# Patient Record
Sex: FEMALE | Race: OTHER | HISPANIC OR LATINO | URBAN - METROPOLITAN AREA
[De-identification: names, ages, dates, MRNs, and addresses within clinical notes are randomized per-mention and may not be internally consistent; named-entity substitution may affect disease eponyms.]

---

## 2021-07-08 ENCOUNTER — EMERGENCY (EMERGENCY)
Facility: HOSPITAL | Age: 21
LOS: 1 days | Discharge: LEFT BEFORE DISCHARGE | End: 2021-07-08
Payer: MEDICAID

## 2021-07-08 ENCOUNTER — INPATIENT (INPATIENT)
Facility: HOSPITAL | Age: 21
LOS: 0 days | Discharge: HOME | End: 2021-07-09
Attending: OBSTETRICS & GYNECOLOGY | Admitting: OBSTETRICS & GYNECOLOGY
Payer: COMMERCIAL

## 2021-07-08 VITALS — DIASTOLIC BLOOD PRESSURE: 68 MMHG | HEART RATE: 98 BPM | SYSTOLIC BLOOD PRESSURE: 170 MMHG

## 2021-07-08 LAB
ABO RH CONFIRMATION: SIGNIFICANT CHANGE UP
BASOPHILS # BLD AUTO: 0.04 K/UL — SIGNIFICANT CHANGE UP (ref 0–0.2)
BASOPHILS NFR BLD AUTO: 0.4 % — SIGNIFICANT CHANGE UP (ref 0–2)
BLD GP AB SCN SERPL QL: SIGNIFICANT CHANGE UP
EOSINOPHIL # BLD AUTO: 0.08 K/UL — SIGNIFICANT CHANGE UP (ref 0–0.5)
EOSINOPHIL NFR BLD AUTO: 0.8 % — SIGNIFICANT CHANGE UP (ref 0–6)
HCT VFR BLD CALC: 33.2 % — LOW (ref 34.5–45)
HGB BLD-MCNC: 10.8 G/DL — LOW (ref 11.5–15.5)
HIV 1 & 2 AB SERPL IA.RAPID: SIGNIFICANT CHANGE UP
IMM GRANULOCYTES NFR BLD AUTO: 0.3 % — SIGNIFICANT CHANGE UP (ref 0–1.5)
LYMPHOCYTES # BLD AUTO: 2.37 K/UL — SIGNIFICANT CHANGE UP (ref 1–3.3)
LYMPHOCYTES # BLD AUTO: 24.5 % — SIGNIFICANT CHANGE UP (ref 13–44)
MCHC RBC-ENTMCNC: 24.6 PG — LOW (ref 27–34)
MCHC RBC-ENTMCNC: 32.5 GM/DL — SIGNIFICANT CHANGE UP (ref 32–36)
MCV RBC AUTO: 75.6 FL — LOW (ref 80–100)
MONOCYTES # BLD AUTO: 0.8 K/UL — SIGNIFICANT CHANGE UP (ref 0–0.9)
MONOCYTES NFR BLD AUTO: 8.3 % — SIGNIFICANT CHANGE UP (ref 2–14)
NEUTROPHILS # BLD AUTO: 6.37 K/UL — SIGNIFICANT CHANGE UP (ref 1.8–7.4)
NEUTROPHILS NFR BLD AUTO: 65.7 % — SIGNIFICANT CHANGE UP (ref 43–77)
PLATELET # BLD AUTO: 212 K/UL — SIGNIFICANT CHANGE UP (ref 150–400)
RBC # BLD: 4.39 M/UL — SIGNIFICANT CHANGE UP (ref 3.8–5.2)
RBC # FLD: 13.5 % — SIGNIFICANT CHANGE UP (ref 10.3–14.5)
SARS-COV-2 RNA SPEC QL NAA+PROBE: SIGNIFICANT CHANGE UP
WBC # BLD: 9.69 K/UL — SIGNIFICANT CHANGE UP (ref 3.8–10.5)
WBC # FLD AUTO: 9.69 K/UL — SIGNIFICANT CHANGE UP (ref 3.8–10.5)

## 2021-07-08 PROCEDURE — L9993: CPT

## 2021-07-08 RX ORDER — CITRIC ACID/SODIUM CITRATE 300-500 MG
30 SOLUTION, ORAL ORAL ONCE
Refills: 0 | Status: DISCONTINUED | OUTPATIENT
Start: 2021-07-08 | End: 2021-07-08

## 2021-07-08 RX ORDER — AER TRAVELER 0.5 G/1
1 SOLUTION RECTAL; TOPICAL EVERY 4 HOURS
Refills: 0 | Status: DISCONTINUED | OUTPATIENT
Start: 2021-07-08 | End: 2021-07-09

## 2021-07-08 RX ORDER — OXYCODONE HYDROCHLORIDE 5 MG/1
5 TABLET ORAL
Refills: 0 | Status: DISCONTINUED | OUTPATIENT
Start: 2021-07-08 | End: 2021-07-09

## 2021-07-08 RX ORDER — LANOLIN
1 OINTMENT (GRAM) TOPICAL EVERY 6 HOURS
Refills: 0 | Status: DISCONTINUED | OUTPATIENT
Start: 2021-07-08 | End: 2021-07-09

## 2021-07-08 RX ORDER — DIBUCAINE 1 %
1 OINTMENT (GRAM) RECTAL EVERY 6 HOURS
Refills: 0 | Status: DISCONTINUED | OUTPATIENT
Start: 2021-07-08 | End: 2021-07-09

## 2021-07-08 RX ORDER — MAGNESIUM HYDROXIDE 400 MG/1
30 TABLET, CHEWABLE ORAL
Refills: 0 | Status: DISCONTINUED | OUTPATIENT
Start: 2021-07-08 | End: 2021-07-09

## 2021-07-08 RX ORDER — KETOROLAC TROMETHAMINE 30 MG/ML
30 SYRINGE (ML) INJECTION ONCE
Refills: 0 | Status: DISCONTINUED | OUTPATIENT
Start: 2021-07-08 | End: 2021-07-09

## 2021-07-08 RX ORDER — IBUPROFEN 200 MG
600 TABLET ORAL EVERY 6 HOURS
Refills: 0 | Status: COMPLETED | OUTPATIENT
Start: 2021-07-08 | End: 2022-06-06

## 2021-07-08 RX ORDER — PRAMOXINE HYDROCHLORIDE 150 MG/15G
1 AEROSOL, FOAM RECTAL EVERY 4 HOURS
Refills: 0 | Status: DISCONTINUED | OUTPATIENT
Start: 2021-07-08 | End: 2021-07-09

## 2021-07-08 RX ORDER — BENZOCAINE 10 %
1 GEL (GRAM) MUCOUS MEMBRANE EVERY 6 HOURS
Refills: 0 | Status: DISCONTINUED | OUTPATIENT
Start: 2021-07-08 | End: 2021-07-09

## 2021-07-08 RX ORDER — TETANUS TOXOID, REDUCED DIPHTHERIA TOXOID AND ACELLULAR PERTUSSIS VACCINE, ADSORBED 5; 2.5; 8; 8; 2.5 [IU]/.5ML; [IU]/.5ML; UG/.5ML; UG/.5ML; UG/.5ML
0.5 SUSPENSION INTRAMUSCULAR ONCE
Refills: 0 | Status: DISCONTINUED | OUTPATIENT
Start: 2021-07-08 | End: 2021-07-09

## 2021-07-08 RX ORDER — SODIUM CHLORIDE 9 MG/ML
1000 INJECTION, SOLUTION INTRAVENOUS
Refills: 0 | Status: DISCONTINUED | OUTPATIENT
Start: 2021-07-08 | End: 2021-07-08

## 2021-07-08 RX ORDER — HYDROCORTISONE 1 %
1 OINTMENT (GRAM) TOPICAL EVERY 6 HOURS
Refills: 0 | Status: DISCONTINUED | OUTPATIENT
Start: 2021-07-08 | End: 2021-07-09

## 2021-07-08 RX ORDER — ACETAMINOPHEN 500 MG
975 TABLET ORAL
Refills: 0 | Status: DISCONTINUED | OUTPATIENT
Start: 2021-07-08 | End: 2021-07-09

## 2021-07-08 RX ORDER — OXYTOCIN 10 UNIT/ML
333.33 VIAL (ML) INJECTION
Qty: 20 | Refills: 0 | Status: DISCONTINUED | OUTPATIENT
Start: 2021-07-08 | End: 2021-07-08

## 2021-07-08 RX ORDER — SIMETHICONE 80 MG/1
80 TABLET, CHEWABLE ORAL EVERY 4 HOURS
Refills: 0 | Status: DISCONTINUED | OUTPATIENT
Start: 2021-07-08 | End: 2021-07-09

## 2021-07-08 RX ORDER — OXYCODONE HYDROCHLORIDE 5 MG/1
5 TABLET ORAL ONCE
Refills: 0 | Status: DISCONTINUED | OUTPATIENT
Start: 2021-07-08 | End: 2021-07-09

## 2021-07-08 RX ORDER — DIPHENHYDRAMINE HCL 50 MG
25 CAPSULE ORAL EVERY 6 HOURS
Refills: 0 | Status: DISCONTINUED | OUTPATIENT
Start: 2021-07-08 | End: 2021-07-09

## 2021-07-08 RX ORDER — SODIUM CHLORIDE 9 MG/ML
3 INJECTION INTRAMUSCULAR; INTRAVENOUS; SUBCUTANEOUS EVERY 8 HOURS
Refills: 0 | Status: DISCONTINUED | OUTPATIENT
Start: 2021-07-08 | End: 2021-07-09

## 2021-07-08 RX ORDER — OXYTOCIN 10 UNIT/ML
333.33 VIAL (ML) INJECTION
Qty: 20 | Refills: 0 | Status: DISCONTINUED | OUTPATIENT
Start: 2021-07-08 | End: 2021-07-09

## 2021-07-08 RX ADMIN — SODIUM CHLORIDE 3 MILLILITER(S): 9 INJECTION INTRAMUSCULAR; INTRAVENOUS; SUBCUTANEOUS at 21:51

## 2021-07-08 RX ADMIN — Medication 1000 MILLIUNIT(S)/MIN: at 19:01

## 2021-07-08 NOTE — OB RN DELIVERY SUMMARY - NSSELHIDDEN_OBGYN_ALL_OB_FT
[NS_DeliveryAttending1_OBGYN_ALL_OB_FT:[id number]=],[NS_DeliveryRN_OBGYN_ALL_OB_FT:[id number]=]

## 2021-07-08 NOTE — OB PROVIDER H&P - ASSESSMENT
Patient is a 21y  at 35w by LMP who presented to L&D in labor.     Plan    - Admission labs: CBC, type and screen, ABO confirmation, urinalysis, RPR, covid-19 pcr stat  - IV fluids: LR  - Pain management: none at this time  - Continuous FHT and Tocometry  - GBS prophylaxis: GBS unknown, however, not enough time to start ampicillin  - expectant management    d/w Dr. Batista

## 2021-07-08 NOTE — OB NEONATOLOGY/PEDIATRICIAN DELIVERY SUMMARY - NSPEDSNEONOTESA_OBGYN_ALL_OB_FT
Requested by Dr. Araiza to attend this  vaginal delivery due to fully dilated, no prenatal care and  meconium of a y/o  mom at @36wks by dates 35weeks by bedside sonogram.  She had no PNC, ALL LABS and COVID swap sended stat.  Unknown maternal history .  L&D: ROM at time of delivery clear fluids.  Baby born vertex  good cry and tone transferred to warmer, orally suctioned, dried,  stimulated; Resuscitation: routine.  BW: 2970 g.  Apgar score 9/9.   A: 35-36  week AGA   maternal COVID-19 pending. Mother wants to give infant for adoption; don't want to see the baby.  P: Baby for transition and when stable to NBN.

## 2021-07-08 NOTE — OB PROVIDER H&P - HISTORY OF PRESENT ILLNESS
Patient is a 21y  at 35w approximately (by LMP). She stated that she went to have an  today and they sent her to the hospital because they said that she was in labor. She denies any complications in this pregnancy however she did not know she was pregnant until today.   Pregnancy complicated by:  1. no prenatal care    obhx:  G1: 2017- normal spontaneous vaginal delivery  gynhx: no history of abnormal pap smears, ovarians cysts, or history of STIs  pmhx: none  pshx: none  meds: pnv  allergies: nkda

## 2021-07-08 NOTE — OB PROVIDER DELIVERY SUMMARY - NSPROVIDERDELIVERYNOTE_OBGYN_ALL_OB_FT
Vaginal Delivery Summary    Procedure: Normal spontaneous vaginal delivery   Findings: Viable female infant delivered in cephalic presentation at 1901, placenta delivered at 1905.  Apgar scores 9/9  Weight 2970g  Laceration(s): 1st degree perineal  Repair: 2-0 vicryl  EBL: 200 ml  Complications: no prenatal care,  delivery, precipitous delivery    Procedure:   Patient felt rectal pressure and was found to be fully dilated, +2 station. She pushed effectively for 5 minutes. She delivered a viable female infant. Placenta delivered intact and pitocin was started at the delivery of infant. Perineum and vagina were inspected, first laceration(s) present. Adequate hemostasis was obtained.

## 2021-07-08 NOTE — OB PROVIDER H&P - NSHPPHYSICALEXAM_GEN_ALL_CORE
General: Alert and oriented x3, no acute distress  Cardiovascular: regular rate and rhythm, no murmurs, rubs or gallops appreciated on exam  Respiratory: clear to auscultation bilaterally  Abdominal: gravid uterus, non-tender to palpation  Pelvic: 10/100/+1  Extremities: no redness, tenderness or swelling in lower extremities bilaterally     FHT: baseline 135bpm, moderate variability, +accels, -deccels  [place]: regular contractions  Ultrasound: vertex, FL shows approximately 36w gestation

## 2021-07-08 NOTE — OB RN DELIVERY SUMMARY - NS_SEPSISRSKCALC_OBGYN_ALL_OB_FT
No temperature has been documented for this patient in CPN or on the OB Flowsheet. Ensure the highest temperature during labor was documented on the OB Flowsheet.  Rupture of membranes must be entered above.  GBS status in the 'Prenatal Lab tests/results section' on the OB RN Patient Profile must be documented.   EOS calculated successfully. EOS Risk Factor: 0.5/1000 live births ([facility] national incidence); GA=35w;Temp=98.8; ROM=0.167; GBS='Unknown'; Antibiotics='No antibiotics or any antibiotics < 2 hrs prior to birth'

## 2021-07-08 NOTE — OB PROVIDER DELIVERY SUMMARY - NSSELHIDDEN_OBGYN_ALL_OB_FT
[NS_DeliveryAttending1_OBGYN_ALL_OB_FT:[email or web address]=],[NS_DeliveryRN_OBGYN_ALL_OB_FT:[id number]=]

## 2021-07-09 ENCOUNTER — TRANSCRIPTION ENCOUNTER (OUTPATIENT)
Age: 21
End: 2021-07-09

## 2021-07-09 VITALS
TEMPERATURE: 99 F | HEART RATE: 70 BPM | OXYGEN SATURATION: 100 % | DIASTOLIC BLOOD PRESSURE: 67 MMHG | SYSTOLIC BLOOD PRESSURE: 103 MMHG | RESPIRATION RATE: 18 BRPM

## 2021-07-09 LAB
COVID-19 SPIKE DOMAIN AB INTERP: POSITIVE
COVID-19 SPIKE DOMAIN ANTIBODY RESULT: >250 U/ML — HIGH
HBV SURFACE AG SERPL QL IA: SIGNIFICANT CHANGE UP
HCT VFR BLD CALC: 31.8 % — LOW (ref 34.5–45)
HGB BLD-MCNC: 10.1 G/DL — LOW (ref 11.5–15.5)
HIV 1+2 AB+HIV1 P24 AG SERPL QL IA: SIGNIFICANT CHANGE UP
MEV IGG SER-ACNC: 29.2 AU/ML — SIGNIFICANT CHANGE UP
MEV IGG+IGM SER-IMP: POSITIVE — SIGNIFICANT CHANGE UP
RUBV IGG SER-ACNC: 0.9 INDEX — SIGNIFICANT CHANGE UP
RUBV IGG SER-IMP: SIGNIFICANT CHANGE UP
SARS-COV-2 IGG+IGM SERPL QL IA: >250 U/ML — HIGH
SARS-COV-2 IGG+IGM SERPL QL IA: POSITIVE
T PALLIDUM AB TITR SER: NEGATIVE — SIGNIFICANT CHANGE UP

## 2021-07-09 PROCEDURE — 86901 BLOOD TYPING SEROLOGIC RH(D): CPT

## 2021-07-09 PROCEDURE — 86780 TREPONEMA PALLIDUM: CPT

## 2021-07-09 PROCEDURE — 85025 COMPLETE CBC W/AUTO DIFF WBC: CPT

## 2021-07-09 PROCEDURE — 87340 HEPATITIS B SURFACE AG IA: CPT

## 2021-07-09 PROCEDURE — 88307 TISSUE EXAM BY PATHOLOGIST: CPT | Mod: 26

## 2021-07-09 PROCEDURE — 88307 TISSUE EXAM BY PATHOLOGIST: CPT

## 2021-07-09 PROCEDURE — 87635 SARS-COV-2 COVID-19 AMP PRB: CPT

## 2021-07-09 PROCEDURE — 86765 RUBEOLA ANTIBODY: CPT

## 2021-07-09 PROCEDURE — 86850 RBC ANTIBODY SCREEN: CPT

## 2021-07-09 PROCEDURE — 86762 RUBELLA ANTIBODY: CPT

## 2021-07-09 PROCEDURE — G0463: CPT

## 2021-07-09 PROCEDURE — 86703 HIV-1/HIV-2 1 RESULT ANTBDY: CPT

## 2021-07-09 PROCEDURE — 86769 SARS-COV-2 COVID-19 ANTIBODY: CPT

## 2021-07-09 PROCEDURE — 59050 FETAL MONITOR W/REPORT: CPT

## 2021-07-09 PROCEDURE — 86900 BLOOD TYPING SEROLOGIC ABO: CPT

## 2021-07-09 PROCEDURE — 85014 HEMATOCRIT: CPT

## 2021-07-09 PROCEDURE — 59025 FETAL NON-STRESS TEST: CPT

## 2021-07-09 PROCEDURE — 36415 COLL VENOUS BLD VENIPUNCTURE: CPT

## 2021-07-09 PROCEDURE — 85018 HEMOGLOBIN: CPT

## 2021-07-09 PROCEDURE — 87389 HIV-1 AG W/HIV-1&-2 AB AG IA: CPT

## 2021-07-09 RX ORDER — ACETAMINOPHEN 500 MG
3 TABLET ORAL
Qty: 0 | Refills: 0 | DISCHARGE
Start: 2021-07-09

## 2021-07-09 RX ORDER — IBUPROFEN 200 MG
1 TABLET ORAL
Qty: 0 | Refills: 0 | DISCHARGE
Start: 2021-07-09

## 2021-07-09 RX ORDER — IBUPROFEN 200 MG
600 TABLET ORAL EVERY 6 HOURS
Refills: 0 | Status: DISCONTINUED | OUTPATIENT
Start: 2021-07-09 | End: 2021-07-09

## 2021-07-09 RX ADMIN — SODIUM CHLORIDE 3 MILLILITER(S): 9 INJECTION INTRAMUSCULAR; INTRAVENOUS; SUBCUTANEOUS at 06:15

## 2021-07-09 RX ADMIN — Medication 975 MILLIGRAM(S): at 09:30

## 2021-07-09 RX ADMIN — Medication 600 MILLIGRAM(S): at 06:14

## 2021-07-09 RX ADMIN — Medication 600 MILLIGRAM(S): at 12:12

## 2021-07-09 RX ADMIN — Medication 600 MILLIGRAM(S): at 12:45

## 2021-07-09 RX ADMIN — Medication 1 TABLET(S): at 12:12

## 2021-07-09 RX ADMIN — Medication 975 MILLIGRAM(S): at 08:48

## 2021-07-09 RX ADMIN — Medication 600 MILLIGRAM(S): at 06:49

## 2021-07-09 NOTE — DISCHARGE NOTE OB - HOSPITAL COURSE
Pt is 22yo  s/p  at approx 35 weeks, precipitous. No PNC. She was transferred to postpartum unit without complications during her stay. Upon discharge she is voiding, tolerating PO, ambulating, and pain is well controlled.

## 2021-07-09 NOTE — DISCHARGE NOTE OB - CARE PLAN
Principal Discharge DX:	 (normal spontaneous vaginal delivery)  Goal:	rapid recovery  Assessment and plan of treatment:	Please call your provider to schedule postpartum visit in 6 weeks. Take medications as directed, regular diet, activity as tolerated. Exclusive breast feeding for the first 6 months is recommended. Nothing per vagina for 6 weeks (incl. sex, douching, etc). If you have additional concerns, please inform your provider.

## 2021-07-09 NOTE — DISCHARGE NOTE OB - CARE PROVIDER_API CALL
Jen Payan)  Obstetrics and Gynecology  [address] [place] [address]  Phone: [phone]  Fax: [phone]  Follow Up Time:

## 2021-07-09 NOTE — PROGRESS NOTE ADULT - ASSESSMENT
A/P: ALYCIA HERNANDEZ is a 21y  now PPD#1 s/p spontaneous vaginal delivery at approximately 35 weeks gestation, uncomplicated.  -Vital signs stable  -Hgb: 11.4 -> AM labs pending   -Voiding, tolerating PO, bowel function nml   -FU with social work for adoption of baby  -FU PNL  -Advance care as tolerated   -Continue routine postpartum care and education  -Dispo: Patient to be discharged when meeting all postpartum milestones and pending attending approval.

## 2021-07-09 NOTE — DISCHARGE NOTE OB - PATIENT PORTAL LINK FT
You can access the FollowMyHealth Patient Portal offered by [facility] by registering at the following website: [email or web address]. By joining [employer]’s FollowMyHealth portal, you will also be able to view your health information using other applications (apps) compatible with our system.

## 2021-07-09 NOTE — PROGRESS NOTE ADULT - SUBJECTIVE AND OBJECTIVE BOX
ALYCIA HERNANDEZ is a 21y  now PPD#1 s/p spontaneous vaginal delivery at approximately 35 weeks gestation, uncomplicated.    S:    No acute events overnight.   The patient has no complaints.  Pain controlled with current treatment regimen.   She is ambulating without difficulty and tolerating PO.   + flatus/-BM/+ voiding   She endorses appropriate lochia, which is decreasing.   She denies fevers, chills, nausea and vomiting.   She denies lightheadedness, dizziness, palpitations, chest pain and SOB.     O:    Vital Signs Last 24 Hrs  T(C): 37 (2021 15:30), Max: 37.1 (2021 04:10)  T(F): 98.6 (2021 15:30), Max: 98.7 (2021 04:10)  HR: 70 (2021 15:30) (70 - 71)  BP: 103/67 (2021 15:30) (103/67 - 109/66)  RR: 18 (2021 15:30) (18 - 18)  SpO2: 100% (2021 15:30) (98% - 100%)    Gen: NAD, AOx3  CV: RRR, S1/S2 present  Pulm: CTAB  Abdomen:  Soft, non-tender, non-distended, +bowel sounds  Uterus:  Fundus firm below umbilicus  VE:  Expectant lochia  Ext:  Non-tender and non-edematous                          10.1   x     )-----------( x        ( 2021 07:30 )             31.8

## 2021-07-21 LAB — SURGICAL PATHOLOGY STUDY: SIGNIFICANT CHANGE UP

## 2021-08-24 NOTE — OB RN DELIVERY SUMMARY - BABY A: APGAR 5 MIN COLOR, DELIVERY
Epidural Block    Date/Time: 8/23/2021 7:05 PM  Performed by: Heidi Cardona CRNA  Authorized by: Heidi Cardona CRNA     Patient Location:  L&D  Indication: Labor Pain    Pre anesthesia checklist Patient Identified, Risks and Benefits Discussed, Monitors and Equipment Checked, Timeout Performed, Pre-op Evaluation Complete and IV Bolus - Crystalloid  Epidural:     Patient Position:  Sitting    Prep:  Chlorhoidine Gluconate    Max Sterile Barrier Technique:  Hand Washing, Cap/Mask, Sterile gloves, Sterile drape and Sterile dressing applied    Monitoring:  Continuous pulse oximetry, heart rate, non-invasive blood pressure and FHT's    Approach:  Midline    Location:  L3-4  Injection Technique:  ERICH air  Ultrasound Used:  No  Needle and Epidural Catheter:     Needle Type:  Tuohy    Needle Gauge:  18 G    Needle Length:  3.5 in    ERICH Depth:  5 cm  Epidural catheter type: multiorifice.  Catheter Size:  20 G  Catheter at Skin Depth:  10 cm  Securement:  Stabilization device, Tape and Transparent dressing  Test Dose:  Lidocaine 1.5% with epinephrine 1-to-200,000  Test Dose Volume (mL):  5  Test Dose Result:  Negative  Staff:     Performed By:  MATT    CRNA:  Heidi Cardona CRNA  Start Time:  8/23/2021 7:03 PM  Notes:      Patient ID'd and consented. Patient in sitting position. Monitors applied and vitals recorded. Sterile prep and drape. 18g Toughy inserted using ERICH technique. ERICH at 5. Catheter threaded to 10. Catheter secured with sterile occlusive dressing. Negative for paresthesia, negative aspiration for heme, and negative aspiration for CSF. Negative test dose with 75mg lidocaine with 25 mcg epinephrine. Patient tolerated well.            (1) body pink, extremities blue

## 2021-08-27 NOTE — OB RN PATIENT PROFILE - NSSTATEDREASONFORADM_OBGYN_A_OB_FT
Spoke to dad and advised to check mychart for result message. Read message to dad he did not have any other concerns or questions.  labor

## 2022-05-19 NOTE — OB RN PATIENT PROFILE - PAIN SCALE PREFERRED, PROFILE
patient c/o Dyspneic on exertion, for the past couple of day, hx of thalassemia, anemia and sickle cell. is scheduled for transfusion at [facility] but can not take "it" any more. was also told that her hemoglobin was low at 5.9. numerical 0-10

## 2023-01-19 ENCOUNTER — INPATIENT (INPATIENT)
Facility: HOSPITAL | Age: 23
LOS: 1 days | Discharge: HOME | DRG: 419 | End: 2023-01-21
Attending: SURGERY | Admitting: SURGERY
Payer: COMMERCIAL

## 2023-01-19 VITALS
WEIGHT: 154.98 LBS | DIASTOLIC BLOOD PRESSURE: 71 MMHG | OXYGEN SATURATION: 100 % | RESPIRATION RATE: 16 BRPM | HEART RATE: 77 BPM | SYSTOLIC BLOOD PRESSURE: 119 MMHG | TEMPERATURE: 100 F

## 2023-01-19 LAB
ALBUMIN SERPL ELPH-MCNC: 4.3 G/DL — SIGNIFICANT CHANGE UP (ref 3.3–5.2)
ALP SERPL-CCNC: 113 U/L — SIGNIFICANT CHANGE UP (ref 40–120)
ALT FLD-CCNC: 14 U/L — SIGNIFICANT CHANGE UP
APPEARANCE UR: CLEAR — SIGNIFICANT CHANGE UP
AST SERPL-CCNC: 18 U/L — SIGNIFICANT CHANGE UP
BACTERIA # UR AUTO: ABNORMAL
BASOPHILS # BLD AUTO: 0.05 K/UL — SIGNIFICANT CHANGE UP (ref 0–0.2)
BASOPHILS NFR BLD AUTO: 0.7 % — SIGNIFICANT CHANGE UP (ref 0–2)
BILIRUB SERPL-MCNC: 0.2 MG/DL — LOW (ref 0.4–2)
BILIRUB UR-MCNC: NEGATIVE — SIGNIFICANT CHANGE UP
BUN SERPL-MCNC: 7 MG/DL — LOW (ref 8–20)
CALCIUM SERPL-MCNC: 9.1 MG/DL — SIGNIFICANT CHANGE UP (ref 8.4–10.5)
CHLORIDE SERPL-SCNC: 105 MMOL/L — SIGNIFICANT CHANGE UP (ref 96–108)
CO2 SERPL-SCNC: 23 MMOL/L — SIGNIFICANT CHANGE UP (ref 22–29)
COLOR SPEC: YELLOW — SIGNIFICANT CHANGE UP
CREAT SERPL-MCNC: 0.6 MG/DL — SIGNIFICANT CHANGE UP (ref 0.5–1.3)
DIFF PNL FLD: ABNORMAL
EGFR: 129 ML/MIN/1.73M2 — SIGNIFICANT CHANGE UP
EOSINOPHIL # BLD AUTO: 0.23 K/UL — SIGNIFICANT CHANGE UP (ref 0–0.5)
EOSINOPHIL NFR BLD AUTO: 3 % — SIGNIFICANT CHANGE UP (ref 0–6)
EPI CELLS # UR: SIGNIFICANT CHANGE UP
GLUCOSE SERPL-MCNC: 82 MG/DL — SIGNIFICANT CHANGE UP (ref 70–99)
GLUCOSE UR QL: NEGATIVE MG/DL — SIGNIFICANT CHANGE UP
HCG SERPL-ACNC: <4 MIU/ML — SIGNIFICANT CHANGE UP
HCT VFR BLD CALC: 38.1 % — SIGNIFICANT CHANGE UP (ref 34.5–45)
HGB BLD-MCNC: 12.6 G/DL — SIGNIFICANT CHANGE UP (ref 11.5–15.5)
IMM GRANULOCYTES NFR BLD AUTO: 0.1 % — SIGNIFICANT CHANGE UP (ref 0–0.9)
KETONES UR-MCNC: NEGATIVE — SIGNIFICANT CHANGE UP
LEUKOCYTE ESTERASE UR-ACNC: NEGATIVE — SIGNIFICANT CHANGE UP
LIDOCAIN IGE QN: 36 U/L — SIGNIFICANT CHANGE UP (ref 22–51)
LYMPHOCYTES # BLD AUTO: 2.04 K/UL — SIGNIFICANT CHANGE UP (ref 1–3.3)
LYMPHOCYTES # BLD AUTO: 27 % — SIGNIFICANT CHANGE UP (ref 13–44)
MCHC RBC-ENTMCNC: 27.5 PG — SIGNIFICANT CHANGE UP (ref 27–34)
MCHC RBC-ENTMCNC: 33.1 GM/DL — SIGNIFICANT CHANGE UP (ref 32–36)
MCV RBC AUTO: 83 FL — SIGNIFICANT CHANGE UP (ref 80–100)
MONOCYTES # BLD AUTO: 0.71 K/UL — SIGNIFICANT CHANGE UP (ref 0–0.9)
MONOCYTES NFR BLD AUTO: 9.4 % — SIGNIFICANT CHANGE UP (ref 2–14)
NEUTROPHILS # BLD AUTO: 4.52 K/UL — SIGNIFICANT CHANGE UP (ref 1.8–7.4)
NEUTROPHILS NFR BLD AUTO: 59.8 % — SIGNIFICANT CHANGE UP (ref 43–77)
NITRITE UR-MCNC: NEGATIVE — SIGNIFICANT CHANGE UP
PH UR: 6 — SIGNIFICANT CHANGE UP (ref 5–8)
PLATELET # BLD AUTO: 233 K/UL — SIGNIFICANT CHANGE UP (ref 150–400)
POTASSIUM SERPL-MCNC: 4.1 MMOL/L — SIGNIFICANT CHANGE UP (ref 3.5–5.3)
POTASSIUM SERPL-SCNC: 4.1 MMOL/L — SIGNIFICANT CHANGE UP (ref 3.5–5.3)
PROT SERPL-MCNC: 7.5 G/DL — SIGNIFICANT CHANGE UP (ref 6.6–8.7)
PROT UR-MCNC: NEGATIVE — SIGNIFICANT CHANGE UP
RBC # BLD: 4.59 M/UL — SIGNIFICANT CHANGE UP (ref 3.8–5.2)
RBC # FLD: 11.9 % — SIGNIFICANT CHANGE UP (ref 10.3–14.5)
RBC CASTS # UR COMP ASSIST: ABNORMAL /HPF (ref 0–4)
SODIUM SERPL-SCNC: 140 MMOL/L — SIGNIFICANT CHANGE UP (ref 135–145)
SP GR SPEC: 1.01 — SIGNIFICANT CHANGE UP (ref 1.01–1.02)
UROBILINOGEN FLD QL: NEGATIVE MG/DL — SIGNIFICANT CHANGE UP
WBC # BLD: 7.56 K/UL — SIGNIFICANT CHANGE UP (ref 3.8–10.5)
WBC # FLD AUTO: 7.56 K/UL — SIGNIFICANT CHANGE UP (ref 3.8–10.5)
WBC UR QL: SIGNIFICANT CHANGE UP /HPF (ref 0–5)

## 2023-01-19 PROCEDURE — 99285 EMERGENCY DEPT VISIT HI MDM: CPT

## 2023-01-19 RX ORDER — ONDANSETRON 8 MG/1
4 TABLET, FILM COATED ORAL ONCE
Refills: 0 | Status: COMPLETED | OUTPATIENT
Start: 2023-01-19 | End: 2023-01-19

## 2023-01-19 RX ORDER — MORPHINE SULFATE 50 MG/1
4 CAPSULE, EXTENDED RELEASE ORAL ONCE
Refills: 0 | Status: DISCONTINUED | OUTPATIENT
Start: 2023-01-19 | End: 2023-01-19

## 2023-01-19 RX ORDER — SODIUM CHLORIDE 9 MG/ML
1000 INJECTION INTRAMUSCULAR; INTRAVENOUS; SUBCUTANEOUS ONCE
Refills: 0 | Status: COMPLETED | OUTPATIENT
Start: 2023-01-19 | End: 2023-01-19

## 2023-01-19 RX ADMIN — MORPHINE SULFATE 4 MILLIGRAM(S): 50 CAPSULE, EXTENDED RELEASE ORAL at 20:44

## 2023-01-19 RX ADMIN — ONDANSETRON 4 MILLIGRAM(S): 8 TABLET, FILM COATED ORAL at 20:44

## 2023-01-19 RX ADMIN — SODIUM CHLORIDE 1000 MILLILITER(S): 9 INJECTION INTRAMUSCULAR; INTRAVENOUS; SUBCUTANEOUS at 20:45

## 2023-01-19 NOTE — ED STATDOCS - OBJECTIVE STATEMENT
23 y/o female presents to the ED c/o 3 days of right sided abdominal pain without associated fevers, chills, N/V/D. Pt denies hx of gall stones, past abdominal surgeries. LMP 1/11.     : Doris

## 2023-01-19 NOTE — ED ADULT TRIAGE NOTE - CHIEF COMPLAINT QUOTE
Patient c/o RLQ pain since Monday. Denies N/V/D or medical hx. Patient has been taking ibuprofen and Tylenol without relief.

## 2023-01-19 NOTE — ED STATDOCS - PROGRESS NOTE DETAILS
PT evaluated by intake physician. HPI/PE/ROS as noted above. Will follow up plan per intake physician. Labs explained to pt Surgery consulted based on results. Pt will be admitted to surgery for further management and evaluation

## 2023-01-19 NOTE — ED STATDOCS - CLINICAL SUMMARY MEDICAL DECISION MAKING FREE TEXT BOX
Pt with 3 days of RUQ abdominal pain will check labs, give IV fluids for hydration, morphine for pain, will obtain US to evaluate for biliary pathology.

## 2023-01-20 ENCOUNTER — TRANSCRIPTION ENCOUNTER (OUTPATIENT)
Age: 23
End: 2023-01-20

## 2023-01-20 ENCOUNTER — RESULT REVIEW (OUTPATIENT)
Age: 23
End: 2023-01-20

## 2023-01-20 DIAGNOSIS — K81.0 ACUTE CHOLECYSTITIS: ICD-10-CM

## 2023-01-20 LAB
ALBUMIN SERPL ELPH-MCNC: 3.8 G/DL — SIGNIFICANT CHANGE UP (ref 3.3–5.2)
ALP SERPL-CCNC: 103 U/L — SIGNIFICANT CHANGE UP (ref 40–120)
ALT FLD-CCNC: 15 U/L — SIGNIFICANT CHANGE UP
ANION GAP SERPL CALC-SCNC: 10 MMOL/L — SIGNIFICANT CHANGE UP (ref 5–17)
APTT BLD: 29.7 SEC — SIGNIFICANT CHANGE UP (ref 27.5–35.5)
AST SERPL-CCNC: 19 U/L — SIGNIFICANT CHANGE UP
BILIRUB SERPL-MCNC: 0.5 MG/DL — SIGNIFICANT CHANGE UP (ref 0.4–2)
BLD GP AB SCN SERPL QL: SIGNIFICANT CHANGE UP
BUN SERPL-MCNC: 6.5 MG/DL — LOW (ref 8–20)
CALCIUM SERPL-MCNC: 8.6 MG/DL — SIGNIFICANT CHANGE UP (ref 8.4–10.5)
CHLORIDE SERPL-SCNC: 107 MMOL/L — SIGNIFICANT CHANGE UP (ref 96–108)
CO2 SERPL-SCNC: 23 MMOL/L — SIGNIFICANT CHANGE UP (ref 22–29)
CREAT SERPL-MCNC: 0.62 MG/DL — SIGNIFICANT CHANGE UP (ref 0.5–1.3)
EGFR: 128 ML/MIN/1.73M2 — SIGNIFICANT CHANGE UP
FLUAV AG NPH QL: SIGNIFICANT CHANGE UP
FLUBV AG NPH QL: SIGNIFICANT CHANGE UP
GLUCOSE SERPL-MCNC: 88 MG/DL — SIGNIFICANT CHANGE UP (ref 70–99)
HCT VFR BLD CALC: 33.7 % — LOW (ref 34.5–45)
HGB BLD-MCNC: 10.9 G/DL — LOW (ref 11.5–15.5)
INR BLD: 1.27 RATIO — HIGH (ref 0.88–1.16)
MCHC RBC-ENTMCNC: 27.3 PG — SIGNIFICANT CHANGE UP (ref 27–34)
MCHC RBC-ENTMCNC: 32.3 GM/DL — SIGNIFICANT CHANGE UP (ref 32–36)
MCV RBC AUTO: 84.3 FL — SIGNIFICANT CHANGE UP (ref 80–100)
PLATELET # BLD AUTO: 185 K/UL — SIGNIFICANT CHANGE UP (ref 150–400)
POTASSIUM SERPL-MCNC: 3.6 MMOL/L — SIGNIFICANT CHANGE UP (ref 3.5–5.3)
POTASSIUM SERPL-SCNC: 3.6 MMOL/L — SIGNIFICANT CHANGE UP (ref 3.5–5.3)
PROT SERPL-MCNC: 6.5 G/DL — LOW (ref 6.6–8.7)
PROTHROM AB SERPL-ACNC: 14.8 SEC — HIGH (ref 10.5–13.4)
RBC # BLD: 4 M/UL — SIGNIFICANT CHANGE UP (ref 3.8–5.2)
RBC # FLD: 11.9 % — SIGNIFICANT CHANGE UP (ref 10.3–14.5)
RSV RNA NPH QL NAA+NON-PROBE: SIGNIFICANT CHANGE UP
SARS-COV-2 RNA SPEC QL NAA+PROBE: SIGNIFICANT CHANGE UP
SODIUM SERPL-SCNC: 140 MMOL/L — SIGNIFICANT CHANGE UP (ref 135–145)
WBC # BLD: 7.07 K/UL — SIGNIFICANT CHANGE UP (ref 3.8–10.5)
WBC # FLD AUTO: 7.07 K/UL — SIGNIFICANT CHANGE UP (ref 3.8–10.5)

## 2023-01-20 PROCEDURE — 76705 ECHO EXAM OF ABDOMEN: CPT | Mod: 26

## 2023-01-20 PROCEDURE — 88304 TISSUE EXAM BY PATHOLOGIST: CPT | Mod: 26

## 2023-01-20 PROCEDURE — 47562 LAPAROSCOPIC CHOLECYSTECTOMY: CPT

## 2023-01-20 PROCEDURE — 93010 ELECTROCARDIOGRAM REPORT: CPT

## 2023-01-20 DEVICE — CLIP APPLIER COVIDIEN ENDOCLIP III 5MM: Type: IMPLANTABLE DEVICE | Status: FUNCTIONAL

## 2023-01-20 RX ORDER — CEFOTETAN DISODIUM 1 G
2 VIAL (EA) INJECTION EVERY 12 HOURS
Refills: 0 | Status: DISCONTINUED | OUTPATIENT
Start: 2023-01-20 | End: 2023-01-20

## 2023-01-20 RX ORDER — ONDANSETRON 8 MG/1
4 TABLET, FILM COATED ORAL EVERY 6 HOURS
Refills: 0 | Status: DISCONTINUED | OUTPATIENT
Start: 2023-01-20 | End: 2023-01-20

## 2023-01-20 RX ORDER — ACETAMINOPHEN 500 MG
1000 TABLET ORAL ONCE
Refills: 0 | Status: COMPLETED | OUTPATIENT
Start: 2023-01-20 | End: 2023-01-20

## 2023-01-20 RX ORDER — ENOXAPARIN SODIUM 100 MG/ML
40 INJECTION SUBCUTANEOUS EVERY 24 HOURS
Refills: 0 | Status: DISCONTINUED | OUTPATIENT
Start: 2023-01-20 | End: 2023-01-20

## 2023-01-20 RX ORDER — KETOROLAC TROMETHAMINE 30 MG/ML
30 SYRINGE (ML) INJECTION ONCE
Refills: 0 | Status: DISCONTINUED | OUTPATIENT
Start: 2023-01-20 | End: 2023-01-20

## 2023-01-20 RX ORDER — HYDROMORPHONE HYDROCHLORIDE 2 MG/ML
0.5 INJECTION INTRAMUSCULAR; INTRAVENOUS; SUBCUTANEOUS
Refills: 0 | Status: DISCONTINUED | OUTPATIENT
Start: 2023-01-20 | End: 2023-01-20

## 2023-01-20 RX ORDER — SODIUM CHLORIDE 9 MG/ML
1000 INJECTION, SOLUTION INTRAVENOUS
Refills: 0 | Status: DISCONTINUED | OUTPATIENT
Start: 2023-01-20 | End: 2023-01-20

## 2023-01-20 RX ORDER — ONDANSETRON 8 MG/1
4 TABLET, FILM COATED ORAL ONCE
Refills: 0 | Status: COMPLETED | OUTPATIENT
Start: 2023-01-20 | End: 2023-01-20

## 2023-01-20 RX ORDER — ACETAMINOPHEN 500 MG
975 TABLET ORAL EVERY 6 HOURS
Refills: 0 | Status: DISCONTINUED | OUTPATIENT
Start: 2023-01-20 | End: 2023-01-21

## 2023-01-20 RX ORDER — KETOROLAC TROMETHAMINE 30 MG/ML
15 SYRINGE (ML) INJECTION EVERY 6 HOURS
Refills: 0 | Status: DISCONTINUED | OUTPATIENT
Start: 2023-01-20 | End: 2023-01-20

## 2023-01-20 RX ORDER — POTASSIUM CHLORIDE 20 MEQ
10 PACKET (EA) ORAL
Refills: 0 | Status: COMPLETED | OUTPATIENT
Start: 2023-01-20 | End: 2023-01-20

## 2023-01-20 RX ORDER — ENOXAPARIN SODIUM 100 MG/ML
40 INJECTION SUBCUTANEOUS EVERY 24 HOURS
Refills: 0 | Status: DISCONTINUED | OUTPATIENT
Start: 2023-01-20 | End: 2023-01-21

## 2023-01-20 RX ORDER — ACETAMINOPHEN 500 MG
650 TABLET ORAL EVERY 6 HOURS
Refills: 0 | Status: DISCONTINUED | OUTPATIENT
Start: 2023-01-20 | End: 2023-01-20

## 2023-01-20 RX ADMIN — Medication 400 MILLIGRAM(S): at 03:40

## 2023-01-20 RX ADMIN — Medication 100 MILLIEQUIVALENT(S): at 09:18

## 2023-01-20 RX ADMIN — Medication 1000 MILLIGRAM(S): at 06:39

## 2023-01-20 RX ADMIN — Medication 30 MILLIGRAM(S): at 21:11

## 2023-01-20 RX ADMIN — MORPHINE SULFATE 4 MILLIGRAM(S): 50 CAPSULE, EXTENDED RELEASE ORAL at 06:39

## 2023-01-20 RX ADMIN — Medication 100 MILLIEQUIVALENT(S): at 07:42

## 2023-01-20 RX ADMIN — Medication 30 MILLIGRAM(S): at 22:19

## 2023-01-20 RX ADMIN — SODIUM CHLORIDE 1000 MILLILITER(S): 9 INJECTION INTRAMUSCULAR; INTRAVENOUS; SUBCUTANEOUS at 00:09

## 2023-01-20 RX ADMIN — Medication 100 MILLIEQUIVALENT(S): at 15:52

## 2023-01-20 RX ADMIN — HYDROMORPHONE HYDROCHLORIDE 0.5 MILLIGRAM(S): 2 INJECTION INTRAMUSCULAR; INTRAVENOUS; SUBCUTANEOUS at 14:15

## 2023-01-20 RX ADMIN — ONDANSETRON 4 MILLIGRAM(S): 8 TABLET, FILM COATED ORAL at 15:00

## 2023-01-20 RX ADMIN — Medication 975 MILLIGRAM(S): at 18:19

## 2023-01-20 RX ADMIN — Medication 1000 MILLIGRAM(S): at 05:39

## 2023-01-20 RX ADMIN — HYDROMORPHONE HYDROCHLORIDE 0.5 MILLIGRAM(S): 2 INJECTION INTRAMUSCULAR; INTRAVENOUS; SUBCUTANEOUS at 14:30

## 2023-01-20 RX ADMIN — SODIUM CHLORIDE 110 MILLILITER(S): 9 INJECTION, SOLUTION INTRAVENOUS at 06:44

## 2023-01-20 NOTE — BRIEF OPERATIVE NOTE - OPERATION/FINDINGS
Inflamed gallbladder. Critical view of safety obtained. Cystic duct and artery clipped and transected. Hemostatic cystic plate.

## 2023-01-20 NOTE — H&P ADULT - ASSESSMENT
ASSESSMENT: Patient is a 22y old female with symptomatic cholelithiasis    PLAN:    - Admit to [facility] floor under Dr. Carlisle  - NPO/IVF  - Cefotetan  - Added on for OR today  - pain control  - DVT ppx  - OOB/ambulate  - strict I/Os  - Plan discussed with Attending, Dr. Carlisle

## 2023-01-20 NOTE — H&P ADULT - NSHPLABSRESULTS_GEN_ALL_CORE
LABS                 12.6   7.56   )----------(  233       ( 2023 20:55 )               38.1      140    |  105    |  7.0    ----------------------------<  82         ( 2023 20:55 )  4.1     |  23.0   |  0.60     Ca    9.1        ( 2023 20:55 )    TPro  7.5    /  Alb  4.3    /  TBili  0.2    /  DBili  x      /  AST  18     /  ALT  14     /  AlkPhos  113    ( 2023 20:55 )    LIVER FUNCTIONS - ( 2023 20:55 )  Alb: 4.3 g/dL / Pro: 7.5 g/dL / ALK PHOS: 113 U/L / ALT: 14 U/L / AST: 18 U/L / GGT: x               CAPILLARY BLOOD GLUCOSE        Urinalysis Basic - ( 2023 21:45 )    Color: Yellow / Appearance: Clear / S.010 / pH: x  Gluc: x / Ketone: Negative  / Bili: Negative / Urobili: Negative mg/dL   Blood: x / Protein: Negative / Nitrite: Negative   Leuk Esterase: Negative / RBC: 3-5 /HPF / WBC 3-5 /HPF   Sq Epi: x / Non Sq Epi: Few / Bacteria: Moderate          --------------------------------------------------------------------------------------------  IMAGING  < from: US Abdomen Upper Quadrant Right (23 @ 01:52) >    FINDINGS:  Liver: Within normal limits.  Bile ducts: Normal caliber. Common bile duct measures 6 mm.  Gallbladder: Multiple mobile gallstones. Pericholecystic fluid. No   sonographic Shine sign however the patient was pain medicated. Thickened   gallbladder wall at 8 mm.  Pancreas: Visualized portions are within normal limits.  Right kidney: 11.7 cm. No hydronephrosis.  Ascites: None.  IVC: Visualized portions are within normal limits.    IMPRESSION:    Multiple mobile gallstones. Pericholecystic fluid. No sonographic Shine   sign however the patient was pain medicated. Thickened gallbladder wall   at 8 mm. findings are suspicious for acute cholecystitis. Correlate   clinically.    < end of copied text >

## 2023-01-20 NOTE — H&P ADULT - ATTENDING COMMENTS
Patient with RUQ pain and minimal LFT elevation  Awake alert  Bilateral BS  Hemodynamic intact  Abdomen soft, some RUQ tenderness no RG  Biliary colic mild cholecystitis / lithiasis  - NPO/IVF  - Cefotetan  - Added on for lap choly  - pain control  - DVT ppx  - OOB/ambulate  - strict I/Os

## 2023-01-20 NOTE — ASU DISCHARGE PLAN (ADULT/PEDIATRIC) - CARE PROVIDER_API CALL
Casey Castrejon)  Surgery Trauma  [address] [place] [address]  Phone: [phone]  Fax: [phone]  Follow Up Time: 2 weeks

## 2023-01-20 NOTE — H&P ADULT - HISTORY OF PRESENT ILLNESS
ACUTE CARE SURGERY H&P     HPI: 22y Female present to ED with approx 4d abdominal pain in RUQ, no radiation. Pain started suddenly, unrelated to PO intake, and has been persistent since. She only got relief today in the hospital. Denies fevers, chills, nausea, vomiting, diarrhea.    --------------------------------------------------------------------------------------------

## 2023-01-20 NOTE — ASU DISCHARGE PLAN (ADULT/PEDIATRIC) - NS MD DC FALL RISK RISK
For information on Fall & Injury Prevention, visit: [email or web address] OR  [email or web address] OR  https://www.cdc.gov/steadi/patient.html

## 2023-01-20 NOTE — H&P ADULT - NSHPPHYSICALEXAM_GEN_ALL_CORE
Vital Signs Last 24 Hrs  T(C): 37 (20 Jan 2023 04:06), Max: 37.6 (19 Jan 2023 19:28)  T(F): 98.6 (20 Jan 2023 04:06), Max: 99.6 (19 Jan 2023 19:28)  HR: 62 (20 Jan 2023 04:06) (62 - 77)  BP: 98/59 (20 Jan 2023 04:06) (98/59 - 119/71)  BP(mean): --  RR: 16 (20 Jan 2023 04:06) (16 - 16)  SpO2: 98% (20 Jan 2023 04:06) (98% - 100%)    Parameters below as of 20 Jan 2023 04:06  Patient On (Oxygen Delivery Method): room air        PHYSICAL EXAM:   General: NAD, Lying in bed comfortably  Neuro: A+Ox3  Resp: Non labored breathing on RA  GI/Abd: Soft, nondistended, tender to palpation in RUQ, no rebound/guarding, no masses palpated  Vascular: All 4 extremities warm and well perfused.   Musculoskeletal: All 4 extremities moving spontaneously    --------------------------------------------------------------------------------------------

## 2023-01-20 NOTE — ASU DISCHARGE PLAN (ADULT/PEDIATRIC) - COMMENTS
Please call [phone] to make an appt with our surgeons for a post-op visit within 2 weeks of dscharge. Instructions/address will be given when you call.

## 2023-01-20 NOTE — ED ADULT NURSE REASSESSMENT NOTE - NS ED NURSE REASSESS COMMENT FT1
repeat labs drawn.  Pt. resting in recliner.
Patient received at 0730; awake; alert and oriented x4. Denies SOB, dizziness, chest pain, abdominal pain. No distress noted. VSS. Respirations unlabored. Report received at bedside. Call bell and personal items in reach. Continue to monitor patient and maintain safety.

## 2023-01-20 NOTE — PATIENT PROFILE ADULT - FALL HARM RISK - UNIVERSAL INTERVENTIONS
Bed in lowest position, wheels locked, appropriate side rails in place/Call bell, personal items and telephone in reach/Instruct patient to call for assistance before getting out of bed or chair/Non-slip footwear when patient is out of bed/[place] to call system/Physically safe environment - no spills, clutter or unnecessary equipment/Purposeful Proactive Rounding/Room/bathroom lighting operational, light cord in reach

## 2023-01-21 ENCOUNTER — TRANSCRIPTION ENCOUNTER (OUTPATIENT)
Age: 23
End: 2023-01-21

## 2023-01-21 VITALS
OXYGEN SATURATION: 99 % | RESPIRATION RATE: 16 BRPM | TEMPERATURE: 98 F | HEART RATE: 71 BPM | SYSTOLIC BLOOD PRESSURE: 115 MMHG | DIASTOLIC BLOOD PRESSURE: 71 MMHG

## 2023-01-21 PROCEDURE — 96365 THER/PROPH/DIAG IV INF INIT: CPT

## 2023-01-21 PROCEDURE — 85730 THROMBOPLASTIN TIME PARTIAL: CPT

## 2023-01-21 PROCEDURE — C9399: CPT

## 2023-01-21 PROCEDURE — 86850 RBC ANTIBODY SCREEN: CPT

## 2023-01-21 PROCEDURE — C1889: CPT

## 2023-01-21 PROCEDURE — 99285 EMERGENCY DEPT VISIT HI MDM: CPT | Mod: 25

## 2023-01-21 PROCEDURE — 36415 COLL VENOUS BLD VENIPUNCTURE: CPT

## 2023-01-21 PROCEDURE — 87637 SARSCOV2&INF A&B&RSV AMP PRB: CPT

## 2023-01-21 PROCEDURE — 84702 CHORIONIC GONADOTROPIN TEST: CPT

## 2023-01-21 PROCEDURE — 80053 COMPREHEN METABOLIC PANEL: CPT

## 2023-01-21 PROCEDURE — 81001 URINALYSIS AUTO W/SCOPE: CPT

## 2023-01-21 PROCEDURE — 86901 BLOOD TYPING SEROLOGIC RH(D): CPT

## 2023-01-21 PROCEDURE — 88304 TISSUE EXAM BY PATHOLOGIST: CPT

## 2023-01-21 PROCEDURE — 85025 COMPLETE CBC W/AUTO DIFF WBC: CPT

## 2023-01-21 PROCEDURE — 96366 THER/PROPH/DIAG IV INF ADDON: CPT

## 2023-01-21 PROCEDURE — 96361 HYDRATE IV INFUSION ADD-ON: CPT

## 2023-01-21 PROCEDURE — 99024 POSTOP FOLLOW-UP VISIT: CPT

## 2023-01-21 PROCEDURE — 83690 ASSAY OF LIPASE: CPT

## 2023-01-21 PROCEDURE — 93005 ELECTROCARDIOGRAM TRACING: CPT

## 2023-01-21 PROCEDURE — 86900 BLOOD TYPING SEROLOGIC ABO: CPT

## 2023-01-21 PROCEDURE — 96375 TX/PRO/DX INJ NEW DRUG ADDON: CPT

## 2023-01-21 PROCEDURE — 85027 COMPLETE CBC AUTOMATED: CPT

## 2023-01-21 PROCEDURE — 76705 ECHO EXAM OF ABDOMEN: CPT

## 2023-01-21 PROCEDURE — 85610 PROTHROMBIN TIME: CPT

## 2023-01-21 RX ORDER — IBUPROFEN 200 MG
600 TABLET ORAL EVERY 6 HOURS
Refills: 0 | Status: DISCONTINUED | OUTPATIENT
Start: 2023-01-21 | End: 2023-01-21

## 2023-01-21 RX ADMIN — Medication 975 MILLIGRAM(S): at 11:40

## 2023-01-21 RX ADMIN — Medication 975 MILLIGRAM(S): at 11:58

## 2023-01-21 RX ADMIN — Medication 975 MILLIGRAM(S): at 06:02

## 2023-01-21 RX ADMIN — Medication 975 MILLIGRAM(S): at 06:08

## 2023-01-21 RX ADMIN — Medication 975 MILLIGRAM(S): at 00:02

## 2023-01-21 RX ADMIN — Medication 975 MILLIGRAM(S): at 01:00

## 2023-01-21 NOTE — PROGRESS NOTE ADULT - SUBJECTIVE AND OBJECTIVE BOX
INTERVAL HPI/OVERNIGHT EVENTS: sleeping comfortably when seen, no events as per bedside RN, tolerating diet    STATUS POST:  michelle carson       MEDICATIONS  (STANDING):  acetaminophen     Tablet .. 975 milliGRAM(s) Oral every 6 hours  enoxaparin Injectable 40 milliGRAM(s) SubCutaneous every 24 hours    MEDICATIONS  (PRN):      Vital Signs Last 24 Hrs  T(C): 36.8 (2023 20:00), Max: 37.1 (2023 07:37)  T(F): 98.2 (2023 20:00), Max: 98.8 (2023 07:37)  HR: 90 (2023 20:00) (57 - 91)  BP: 108/63 (2023 20:00) (92/53 - 126/63)  BP(mean): --  RR: 17 (2023 20:00) (12 - 20)  SpO2: 99% (2023 20:00) (95% - 100%)    Parameters below as of 2023 20:00  Patient On (Oxygen Delivery Method): room air        PHYSICAL EXAM:      Constitutional: NAD    Respiratory: no accessory muscle use or conversational dyspnea    Cardiovascular: RRR    Gastrointestinal: diffuse mild tenderness, wounds C/D/I, soft, no distention    Extremities: VICENTE          I&O's Detail    2023 07:01  -  2023 03:06  --------------------------------------------------------  IN:    IV PiggyBack: 100 mL    Lactated Ringers: 160 mL    Oral Fluid: 120 mL  Total IN: 380 mL    OUT:  Total OUT: 0 mL    Total NET: 380 mL          LABS:                        10.9   7.07  )-----------( 185      ( 2023 06:15 )             33.7     01-20    140  |  107  |  6.5<L>  ----------------------------<  88  3.6   |  23.0  |  0.62    Ca    8.6      2023 06:15    TPro  6.5<L>  /  Alb  3.8  /  TBili  0.5  /  DBili  x   /  AST  19  /  ALT  15  /  AlkPhos  103  01-20    PT/INR - ( 2023 06:15 )   PT: 14.8 sec;   INR: 1.27 ratio         PTT - ( 2023 06:15 )  PTT:29.7 sec  Urinalysis Basic - ( 2023 21:45 )    Color: Yellow / Appearance: Clear / S.010 / pH: x  Gluc: x / Ketone: Negative  / Bili: Negative / Urobili: Negative mg/dL   Blood: x / Protein: Negative / Nitrite: Negative   Leuk Esterase: Negative / RBC: 3-5 /HPF / WBC 3-5 /HPF   Sq Epi: x / Non Sq Epi: Few / Bacteria: Moderate        RADIOLOGY & ADDITIONAL STUDIES:

## 2023-01-21 NOTE — DISCHARGE NOTE NURSING/CASE MANAGEMENT/SOCIAL WORK - NSDCPEFALRISK_GEN_ALL_CORE
For information on Fall & Injury Prevention, visit: [email or web address] OR  [email or web address] OR  https://www.cdc.gov/steadi/patient.html

## 2023-01-21 NOTE — PROGRESS NOTE ADULT - NS ATTEND AMEND GEN_ALL_CORE FT
Agree with above assessment.  The patient was seen and examined by myself with the surgical PA.  The patient is without abdominal pain, nausea, or vomit.  Abdomen is soft and incisions are clean without infection. The patient is stable for discharge home. Agree with above assessment.  The patient was seen and examined by myself with the surgical PA.  The patient is without abdominal pain, nausea, or vomit.    Awake alert  Bilateral BS  Hemodynamic intact  Abdomen is soft and incisions are clean without infection. The patient is stable for discharge home.

## 2023-01-21 NOTE — PROGRESS NOTE ADULT - ASSESSMENT
22F s/p lap alli    - tolerating diet without difficulty  - pain well controlled  - probable d/c home later today

## 2023-01-21 NOTE — DISCHARGE NOTE NURSING/CASE MANAGEMENT/SOCIAL WORK - PATIENT PORTAL LINK FT
You can access the FollowMyHealth Patient Portal offered by [facility] by registering at the following website: [email or web address]. By joining [employer]’s FollowMyHealth portal, you will also be able to view your health information using other applications (apps) compatible with our system.

## 2023-01-22 ENCOUNTER — TRANSCRIPTION ENCOUNTER (OUTPATIENT)
Age: 23
End: 2023-01-22

## 2023-01-23 PROBLEM — Z00.00 ENCOUNTER FOR PREVENTIVE HEALTH EXAMINATION: Status: ACTIVE | Noted: 2023-01-23

## 2023-01-25 LAB — SURGICAL PATHOLOGY STUDY: SIGNIFICANT CHANGE UP

## 2023-02-02 ENCOUNTER — APPOINTMENT (OUTPATIENT)
Dept: TRAUMA SURGERY | Facility: CLINIC | Age: 23
End: 2023-02-02
Payer: MEDICAID

## 2023-02-02 VITALS
HEART RATE: 77 BPM | HEIGHT: 65 IN | BODY MASS INDEX: 28.16 KG/M2 | TEMPERATURE: 97.6 F | OXYGEN SATURATION: 97 % | DIASTOLIC BLOOD PRESSURE: 65 MMHG | SYSTOLIC BLOOD PRESSURE: 105 MMHG | WEIGHT: 169 LBS | RESPIRATION RATE: 14 BRPM

## 2023-02-02 DIAGNOSIS — K81.2 ACUTE CHOLECYSTITIS WITH CHRONIC CHOLECYSTITIS: ICD-10-CM

## 2023-02-02 DIAGNOSIS — Z78.9 OTHER SPECIFIED HEALTH STATUS: ICD-10-CM

## 2023-02-02 DIAGNOSIS — Z90.49 ACQUIRED ABSENCE OF OTHER SPECIFIED PARTS OF DIGESTIVE TRACT: ICD-10-CM

## 2023-02-02 PROCEDURE — 99024 POSTOP FOLLOW-UP VISIT: CPT

## 2023-02-03 PROBLEM — K81.2 ACUTE CHOLECYSTITIS WITH CHRONIC CHOLECYSTITIS: Status: ACTIVE | Noted: 2023-02-03

## 2023-02-03 PROBLEM — Z90.49 STATUS POST LAPAROSCOPIC CHOLECYSTECTOMY: Status: ACTIVE | Noted: 2023-02-03

## 2023-02-03 PROBLEM — Z78.9 SOCIAL ALCOHOL USE: Status: ACTIVE | Noted: 2023-02-02

## 2023-02-03 NOTE — PHYSICAL EXAM
[Normal Breath Sounds] : Normal breath sounds [Normal Heart Sounds] : normal heart sounds [Abdomen Tenderness] : ~T ~M No abdominal tenderness [No Rash or Lesion] : No rash or lesion [Purpura] : no purpura  [Petechiae] : no petechiae [Skin Ulcer] : no ulcer [Skin Induration] : no induration [Alert] : alert [Oriented to Person] : oriented to person [Oriented to Place] : oriented to place [Oriented to Time] : oriented to time [Calm] : calm [de-identified] : wdwn  female  in  nad [de-identified] : PERRLA EOMS intact  and  nl   non icteric sclera [de-identified] : trachea  sayda [de-identified] : soft  flat  non  tender no  distension of  abdomen   all incision sites c/d/i  no signs of cellulitis  no  formation of  seroma   no  guarding  no rebound

## 2023-02-03 NOTE — HISTORY OF PRESENT ILLNESS
[FreeTextEntry1] : Patient presents  to [facility]  clinic  for  post op exam  s/p laparoscopic cholecystectomy   Patient a t  time of  this e xam denies  any  fevers no  chills  no n/v/d   nl bm nl urination no  abdominal  pain  no  food  intolerances  no  acute physical  complaints

## 2023-02-03 NOTE — ASSESSMENT
[FreeTextEntry1] : RTC prn \par RTW 2/06/2023- no heavy lifting pulling  and  or  pushing\par Diet modification - avoid  fatty  fried  foods\par Discussion with patient   All questions answered  Any acute symptoms and or concerns patient understands  to call back office  and  or  go  directly to [facility] ED\par

## 2023-02-03 NOTE — REVIEW OF SYSTEMS
[Abdominal Pain] : no abdominal pain [Vomiting] : no vomiting [Constipation] : no constipation [Diarrhea] : no diarrhea [Heartburn] : no heartburn [Negative] : Psychiatric [FreeTextEntry7] : s/p  laparoscopic cholecystectomy

## 2023-07-14 NOTE — BRIEF OPERATIVE NOTE - TYPE OF ANESTHESIA
[General Appearance - Well Developed] : well developed [General Appearance - Well Nourished] : well nourished [Normal Appearance] : normal appearance [Well Groomed] : well groomed [General Appearance - In No Acute Distress] : no acute distress [FreeTextEntry1] : AUS pump cycles properly.  Pt demonstrated cycling.  After standing there was minimal drip of urine.  He then cycled pump and immediately 110cc of clear yellow voided into urinal.  PVR 0cc.  Well healed. General

## 2025-01-29 NOTE — PATIENT PROFILE ADULT - OVER THE PAST TWO WEEKS, HAVE YOU FELT LITTLE INTEREST OR PLEASURE IN DOING THINGS?
Surgery Procedure     PREOP DX:  1. Skin tags of back x 2  2. Sebaceous cyst, 1 cm of mid back      POSTOP DX:  Same     PROCEDURE:   1. Removal of skin tags x 2  2. Excision of Sebaceous cyst, subcuticular 1 cm of the mid back       SURGEON:   Gabby Blair PA-C       ANESTHESIA: 1% lidocaine with epi     FINDINGS:  Soft tissue mass consistent with sebaceous cyst and skin tags     INDICATIONS: Irritated skin tags and  Symptomatic soft tissue mass consistent with a sebaceous cyst that periodically ruptures     PROCEDURE:  After informed consent was obtained, the patient was taken to the procedure room. The patient was then prepped and draped in the usual sterile fashion.  1% lidocaine with epinephrine was injected using a 23 gauge needle for local anesthetic. Both skin tags were elevated with an adson forceps.  A 15 blade scalpel was used to amputate the tags.  Hemostasis confirmed through pressure and thermal cautery. A 15 blade scalpel was used to create an eliptical incision over the sebaceous cyst, including the pit.  The skin edges were elevated using Adson’s and the sebaceous cyst was removed in its entirety.   The cavity was irrigated with normal saline.  3-0 nylon suture was used to close the incision. The wound dressed sterilely with gauze. Postprocedure instruction given.     EBL:  Minimal     COMPLICATIONS:  None     SPECIMEN:  None to pathology         Gabby Blair PA-C      
no

## (undated) DEVICE — INSUFFLATION NDL ETHICON ENDOPATH PNEUMOPARITONEUM 120MM

## (undated) DEVICE — SUT VICRYL 0 27" UR-6

## (undated) DEVICE — DRSG STERISTRIPS 0.5 X 4"

## (undated) DEVICE — Device

## (undated) DEVICE — TROCAR COVIDIEN VERSAPORT BLADELESS OPTICAL 11MM STANDARD

## (undated) DEVICE — DISSECTOR ENDOSCOPIC KITTNER SINGLE TIP

## (undated) DEVICE — BLADE SURGICAL #11 CARBON

## (undated) DEVICE — TUBING STRYKEFLOW II SUCTION / IRRIGATOR

## (undated) DEVICE — WARMING BLANKET UPPER ADULT

## (undated) DEVICE — SUT PDS II 0 18" ENDOLOOP LIGATURE

## (undated) DEVICE — INSUFFLATION NDL ETHICON ENDOPATH PNEUMOPARITONEUM 150MM

## (undated) DEVICE — BLADE SURGICAL #15 CARBON

## (undated) DEVICE — ENDOCATCH 10MM SPECIMEN POUCH

## (undated) DEVICE — PACK GENERAL LAPAROSCOPY

## (undated) DEVICE — TROCAR COVIDIEN VERSAPORT BLADELESS OPTICAL 5MM STANDARD

## (undated) DEVICE — GLV 8 PROTEXIS (BLUE)

## (undated) DEVICE — SUT VICRYL PLUS 4-0 18" PS-2 UNDYED

## (undated) DEVICE — ELCTR CORD FOOTSWITCH 1PLR LAPSCP 10FT

## (undated) DEVICE — SUT MONOCRYL 4-0 27" PS-2 UNDYED

## (undated) DEVICE — VENODYNE/SCD SLEEVE CALF MEDIUM

## (undated) DEVICE — TROCAR ETHICON ENDOPATH XCEL UNIVERSAL SLEEVE WITH OPTIVIEW 5MM X 100MM

## (undated) DEVICE — SYR CONTROL LUER LOK 10CC

## (undated) DEVICE — D HELP - CLEARVIEW CLEARIFY SYSTEM

## (undated) DEVICE — SUT VICRYL 2-0 27" CP-2 UNDYED

## (undated) DEVICE — GLV 8 PROTEXIS (WHITE)